# Patient Record
Sex: FEMALE | Race: WHITE | NOT HISPANIC OR LATINO | URBAN - METROPOLITAN AREA
[De-identification: names, ages, dates, MRNs, and addresses within clinical notes are randomized per-mention and may not be internally consistent; named-entity substitution may affect disease eponyms.]

---
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
## 2022-09-12 ENCOUNTER — NON-APPOINTMENT (OUTPATIENT)
Age: 22
End: 2022-09-12

## 2022-09-13 ENCOUNTER — EMERGENCY (EMERGENCY)
Facility: HOSPITAL | Age: 22
LOS: 1 days | Discharge: HOME | End: 2022-09-13
Attending: EMERGENCY MEDICINE | Admitting: EMERGENCY MEDICINE
Payer: OTHER GOVERNMENT

## 2022-09-13 VITALS
WEIGHT: 119.93 LBS | OXYGEN SATURATION: 96 % | TEMPERATURE: 99 F | RESPIRATION RATE: 18 BRPM | HEIGHT: 68 IN | SYSTOLIC BLOOD PRESSURE: 125 MMHG | HEART RATE: 113 BPM | DIASTOLIC BLOOD PRESSURE: 86 MMHG

## 2022-09-13 PROCEDURE — 99282 EMERGENCY DEPT VISIT SF MDM: CPT | Mod: 25

## 2022-09-13 PROCEDURE — 29515 APPLICATION SHORT LEG SPLINT: CPT | Mod: RT

## 2022-09-13 PROCEDURE — 99284 EMERGENCY DEPT VISIT MOD MDM: CPT

## 2022-09-13 NOTE — ED PROVIDER NOTE - PHYSICAL EXAMINATION
right foot- tenderness over 5th metatarsal, pain on ROM, positive 2+ pedal pulses, less than 2 sec cap refill

## 2022-09-13 NOTE — ED PROVIDER NOTE - NSFOLLOWUPINSTRUCTIONS_ED_ALL_ED_FT
Rest, ice   elevate   Take motrin 400mg every 6 hours for pain and swelling   Keep splint in place   Keep splint clean and dry   Apply ice to splint 3 x daily   Follow up with podiatrist   Use crutches for ambulating.   Return if any worsening or persistent symptoms.         Foot Fracture in Adults    WHAT YOU NEED TO KNOW:    What do I need to know about a foot fracture? A foot fracture is a break in a bone in your foot.  Foot Anatomy         What are the signs and symptoms of a foot fracture?   •Tenderness over the injured area      •Foot pain that increases when you try to stand or walk      •Numbness in your foot or toes      •Cracking sounds when you move your foot      •Swelling, bruising, blistering, or open skin breaks      •Trouble moving your foot or walking      •Foot shape that is not normal      How is a foot fracture diagnosed? Your healthcare provider will examine your foot and check for decreased feeling. He or she will check for any open skin breaks. He or she may check your foot movement. You may need any of the following tests:  •An x-ray, CT scan, or MRI may be used to check for a broken bone or other injury. Contrast liquid may be used to help your foot show up better in the pictures. Tell the healthcare provider if you have ever had an allergic reaction to contrast liquid. Do not enter the MRI room with anything metal. Metal can cause serious injury. Tell the healthcare provider if you have any metal in or on your body.      •A bone scan may be used to check for a broken bone. You will be given a small amount of radioactive dye in an IV. Pictures will then be taken of your foot.      How is a foot fracture treated? Treatment depends on the kind of fracture you have and how bad it is. You may need any of the following:  •A boot, cast, or splint may be put on your foot and lower leg to decrease your foot movement. These work to hold the broken bones in place, decrease pain, and prevent more damage to your foot.      •Medicines may be given to prevent or treat pain or a bacterial infection. You may also need a vaccine to prevent tetanus if bone broke through the skin. A tetanus shot is given if you have not had a booster in the past 5 to 10 years.      •Surgery may be used to put your bones back into the correct position. Wires, pins, plates, or screws may be used to keep the broken pieces lined up correctly and hold them together.      What can I do to help my foot heal?   •Rest your foot and avoid activities that cause pain.      •Apply ice to decrease swelling and pain, and to prevent tissue damage. Use an ice pack, or put crushed ice in a plastic bag. Cover it with a towel before you apply it. Use ice for 15 to 20 minutes every hour or as directed.      •Elevate your foot above the level of your heart as often as you can. This will help decrease swelling and pain. Prop your foot on pillows or blankets to keep it elevated comfortably.  Elevate Leg           •Physical therapy may be needed when your foot has healed. A physical therapist can teach you exercises to help improve movement and strength, and to decrease pain.      Call your local emergency number (911 in the [place]) if:   •You suddenly feel lightheaded and short of breath.      •You have chest pain when you take a deep breath or cough.      •You cough up blood.      When should I seek immediate care?   •The pain in your injured foot gets worse even after you rest and take pain medicine.      •The skin or toes of your foot become numb, swollen, cold, white, or blue.      •You have more pain or swelling than you did before a cast was put on.      •Your leg feels warm, tender, and painful. It may look swollen and red.      When should I call my doctor?   •You have a fever.      •You have new sores around your boot, cast, or splint.      •You have new or worsening trouble moving your foot.      •You notice a foul smell coming from under your cast.      •Your boot, cast, or splint gets damaged.      •You have questions or concerns about your condition or care.      CARE AGREEMENT:    You have the right to help plan your care. Learn about your health condition and how it may be treated. Discuss treatment options with your healthcare providers to decide what care you want to receive. You always have the right to refuse treatment.        © Copyright [employer] 2022           back to top                          © Copyright [employer] 2022

## 2022-09-13 NOTE — ED PROVIDER NOTE - CLINICAL SUMMARY MEDICAL DECISION MAKING FREE TEXT BOX
21y/o female with no pmhx sent from urgent care for casting of broken foot. Patient with anterior ecchymosis and swelling, very tender to touch. +2 pedal pulses, warm, +capillary refill.   XRay from urgent care 9/13:   Acute nondisplaced comminuted fracture of the base of the fifth metatarsal with likely extension into the fifth tarsometatarsal joint. No dislocation.    Plan: posterior splint, will send referral for orthopedics. 23y/o female with no pmhx sent from urgent care for casting of broken right foot. Patient with anterior ecchymosis and swelling of right foot, very tender to touch. +2 pedal pulses, warm, +capillary refill.   XRay from urgent care 9/13:   Acute nondisplaced comminuted fracture of the base of the fifth metatarsal with likely extension into the fifth tarsometatarsal joint. No dislocation.    Plan: posterior splint, will send referral for orthopedics. 21y/o female sent from urgent care due to right foot pain s/p stepping off a curb the wrong way and heard a "crunch."  Pt was seen at urgent care and was told fracture. pt was given crutches and sent home. Pt presents to ED requesting a " cast". Denies any other symptoms.  XRay from urgent care 9/13:   Acute nondisplaced comminuted fracture of the base of the fifth metatarsal with likely extension into the fifth tarsometatarsal joint. No dislocation.       Plan: posterior splint applied , will send referral for orthopedics.   pt stable for dc

## 2022-09-13 NOTE — ED ADULT NURSE NOTE - CHIEF COMPLAINT QUOTE
Pt states she stepped off a curb the wrong way and heard a "crunch." Foot was swelling some time after that and then Patient went to urgent care. At urgent care, they stated the foot was broken.

## 2022-09-13 NOTE — ED ADULT NURSE NOTE - OBJECTIVE STATEMENT
Pt states she stepped off a curb the wrong way and heard a "crunch." Foot was swelling some time after that and then Patient went to urgent care. At urgent care, they stated the foot was broken.  Pt. arrived with crutches and foot in boot.  Psotive PMS noted to bilateral lower extremities.

## 2022-09-13 NOTE — ED PROVIDER NOTE - MUSCULOSKELETAL MINIMAL EXAM
L foot/ankle/RANGE OF MOTION LIMITED/TENDERNESS R foot/ankle/RANGE OF MOTION LIMITED/TENDERNESS RANGE OF MOTION LIMITED/TENDERNESS

## 2022-09-13 NOTE — ED PROVIDER NOTE - MUSCULOSKELETAL [+], MLM
L foot and ankle pain/LIMITED RANGE OF MOTION R foot and ankle pain/LIMITED RANGE OF MOTION R foot pain/LIMITED RANGE OF MOTION

## 2022-09-13 NOTE — ED PROVIDER NOTE - OBJECTIVE STATEMENT
21y/o female sent from urgent care for casting of broken foot. 21y/o female sent from urgent care for casting of broken right foot. 21y/o female sent from urgent care due to right foot pain s/p stepping off a curb the wrong way and heard a "crunch."  Pt was seen at urgent care and was told fracture. pt was given crutches and sent home. Pt presents to ED requesting a " cast". Denies any other symptoms.

## 2022-09-13 NOTE — ED PROVIDER NOTE - PATIENT PORTAL LINK FT
You can access the FollowMyHealth Patient Portal offered by [facility] by registering at the following website: [email or web address]. By joining [employer]’s FollowMyHealth portal, you will also be able to view your health information using other applications (apps) compatible with our system.

## 2022-09-13 NOTE — ED PROVIDER NOTE - ATTENDING APP SHARED VISIT CONTRIBUTION OF CARE
This was shared visit with RAZ. I have reviewed and verified the documentation and independently performed the documented history, exam and mdm  21y/o female sent from urgent care due to right foot pain s/p stepping off a curb the wrong way and heard a "crunch."  Pt was seen at urgent care and was told fracture. pt was given crutches and sent home. Pt presents to ED requesting a " cast". Denies any other symptoms.

## 2022-10-11 ENCOUNTER — EMERGENCY (EMERGENCY)
Facility: HOSPITAL | Age: 22
LOS: 1 days | Discharge: HOME | End: 2022-10-11
Attending: EMERGENCY MEDICINE | Admitting: EMERGENCY MEDICINE
Payer: OTHER GOVERNMENT

## 2022-10-11 VITALS
OXYGEN SATURATION: 96 % | DIASTOLIC BLOOD PRESSURE: 91 MMHG | RESPIRATION RATE: 18 BRPM | HEART RATE: 122 BPM | WEIGHT: 119.93 LBS | TEMPERATURE: 98 F | SYSTOLIC BLOOD PRESSURE: 126 MMHG | HEIGHT: 68 IN

## 2022-10-11 VITALS
DIASTOLIC BLOOD PRESSURE: 76 MMHG | RESPIRATION RATE: 16 BRPM | HEART RATE: 93 BPM | SYSTOLIC BLOOD PRESSURE: 114 MMHG | OXYGEN SATURATION: 97 % | TEMPERATURE: 98 F

## 2022-10-11 DIAGNOSIS — F31.9 BIPOLAR DISORDER, UNSPECIFIED: ICD-10-CM

## 2022-10-11 DIAGNOSIS — Z90.49 ACQUIRED ABSENCE OF OTHER SPECIFIED PARTS OF DIGESTIVE TRACT: Chronic | ICD-10-CM

## 2022-10-11 LAB
ALBUMIN SERPL ELPH-MCNC: 4.2 G/DL — SIGNIFICANT CHANGE UP (ref 3.3–5)
ALP SERPL-CCNC: 74 U/L — SIGNIFICANT CHANGE UP (ref 40–120)
ALT FLD-CCNC: 34 U/L — SIGNIFICANT CHANGE UP (ref 10–45)
AMPHET UR-MCNC: NEGATIVE — SIGNIFICANT CHANGE UP
ANION GAP SERPL CALC-SCNC: 14 MMOL/L — SIGNIFICANT CHANGE UP (ref 5–17)
APAP SERPL-MCNC: <1 UG/ML — LOW (ref 10–30)
APPEARANCE UR: ABNORMAL
AST SERPL-CCNC: 29 U/L — SIGNIFICANT CHANGE UP (ref 10–40)
BACTERIA # UR AUTO: ABNORMAL /HPF
BARBITURATES UR SCN-MCNC: NEGATIVE — SIGNIFICANT CHANGE UP
BASOPHILS # BLD AUTO: 0.02 K/UL — SIGNIFICANT CHANGE UP (ref 0–0.2)
BASOPHILS NFR BLD AUTO: 0.3 % — SIGNIFICANT CHANGE UP (ref 0–2)
BENZODIAZ UR-MCNC: NEGATIVE — SIGNIFICANT CHANGE UP
BILIRUB SERPL-MCNC: 0.7 MG/DL — SIGNIFICANT CHANGE UP (ref 0.2–1.2)
BILIRUB UR-MCNC: NEGATIVE — SIGNIFICANT CHANGE UP
BUN SERPL-MCNC: 7 MG/DL — SIGNIFICANT CHANGE UP (ref 7–23)
CALCIUM SERPL-MCNC: 9.1 MG/DL — SIGNIFICANT CHANGE UP (ref 8.4–10.5)
CHLORIDE SERPL-SCNC: 106 MMOL/L — SIGNIFICANT CHANGE UP (ref 96–108)
CO2 SERPL-SCNC: 24 MMOL/L — SIGNIFICANT CHANGE UP (ref 22–31)
COCAINE METAB.OTHER UR-MCNC: NEGATIVE — SIGNIFICANT CHANGE UP
COLOR SPEC: YELLOW — SIGNIFICANT CHANGE UP
COMMENT - URINE: SIGNIFICANT CHANGE UP
CREAT SERPL-MCNC: 0.64 MG/DL — SIGNIFICANT CHANGE UP (ref 0.5–1.3)
DIFF PNL FLD: NEGATIVE — SIGNIFICANT CHANGE UP
EGFR: 130 ML/MIN/1.73M2 — SIGNIFICANT CHANGE UP
EOSINOPHIL # BLD AUTO: 0.02 K/UL — SIGNIFICANT CHANGE UP (ref 0–0.5)
EOSINOPHIL NFR BLD AUTO: 0.3 % — SIGNIFICANT CHANGE UP (ref 0–6)
EPI CELLS # UR: SIGNIFICANT CHANGE UP
ETHANOL SERPL-MCNC: 146 MG/DL — HIGH (ref 0–3)
GLUCOSE SERPL-MCNC: 80 MG/DL — SIGNIFICANT CHANGE UP (ref 70–99)
GLUCOSE UR QL: NEGATIVE — SIGNIFICANT CHANGE UP
HCT VFR BLD CALC: 43.4 % — SIGNIFICANT CHANGE UP (ref 34.5–45)
HGB BLD-MCNC: 15.1 G/DL — SIGNIFICANT CHANGE UP (ref 11.5–15.5)
IMM GRANULOCYTES NFR BLD AUTO: 0.3 % — SIGNIFICANT CHANGE UP (ref 0–0.9)
KETONES UR-MCNC: ABNORMAL
LEUKOCYTE ESTERASE UR-ACNC: NEGATIVE — SIGNIFICANT CHANGE UP
LITHIUM SERPL-MCNC: <0.2 MMOL/L — LOW (ref 0.6–1.2)
LYMPHOCYTES # BLD AUTO: 1.96 K/UL — SIGNIFICANT CHANGE UP (ref 1–3.3)
LYMPHOCYTES # BLD AUTO: 31.2 % — SIGNIFICANT CHANGE UP (ref 13–44)
MCHC RBC-ENTMCNC: 32.8 PG — SIGNIFICANT CHANGE UP (ref 27–34)
MCHC RBC-ENTMCNC: 34.8 GM/DL — SIGNIFICANT CHANGE UP (ref 32–36)
MCV RBC AUTO: 94.3 FL — SIGNIFICANT CHANGE UP (ref 80–100)
METHADONE UR-MCNC: NEGATIVE — SIGNIFICANT CHANGE UP
MONOCYTES # BLD AUTO: 0.26 K/UL — SIGNIFICANT CHANGE UP (ref 0–0.9)
MONOCYTES NFR BLD AUTO: 4.1 % — SIGNIFICANT CHANGE UP (ref 2–14)
NEUTROPHILS # BLD AUTO: 4.01 K/UL — SIGNIFICANT CHANGE UP (ref 1.8–7.4)
NEUTROPHILS NFR BLD AUTO: 63.8 % — SIGNIFICANT CHANGE UP (ref 43–77)
NITRITE UR-MCNC: NEGATIVE — SIGNIFICANT CHANGE UP
NRBC # BLD: 0 /100 WBCS — SIGNIFICANT CHANGE UP (ref 0–0)
OPIATES UR-MCNC: NEGATIVE — SIGNIFICANT CHANGE UP
PCP SPEC-MCNC: SIGNIFICANT CHANGE UP
PCP UR-MCNC: NEGATIVE — SIGNIFICANT CHANGE UP
PH UR: 7 — SIGNIFICANT CHANGE UP (ref 5–8)
PLATELET # BLD AUTO: 310 K/UL — SIGNIFICANT CHANGE UP (ref 150–400)
POTASSIUM SERPL-MCNC: 3.4 MMOL/L — LOW (ref 3.5–5.3)
POTASSIUM SERPL-SCNC: 3.4 MMOL/L — LOW (ref 3.5–5.3)
PROT SERPL-MCNC: 7.3 G/DL — SIGNIFICANT CHANGE UP (ref 6–8.3)
PROT UR-MCNC: NEGATIVE — SIGNIFICANT CHANGE UP
RAPID RVP RESULT: SIGNIFICANT CHANGE UP
RBC # BLD: 4.6 M/UL — SIGNIFICANT CHANGE UP (ref 3.8–5.2)
RBC # FLD: 12.3 % — SIGNIFICANT CHANGE UP (ref 10.3–14.5)
RBC CASTS # UR COMP ASSIST: NEGATIVE /HPF — SIGNIFICANT CHANGE UP (ref 0–4)
SALICYLATES SERPL-MCNC: 0.4 MG/DL — LOW (ref 3–30)
SARS-COV-2 RNA SPEC QL NAA+PROBE: SIGNIFICANT CHANGE UP
SODIUM SERPL-SCNC: 144 MMOL/L — SIGNIFICANT CHANGE UP (ref 135–145)
SP GR SPEC: 1.01 — SIGNIFICANT CHANGE UP (ref 1.01–1.02)
THC UR QL: NEGATIVE — SIGNIFICANT CHANGE UP
UROBILINOGEN FLD QL: NEGATIVE — SIGNIFICANT CHANGE UP
WBC # BLD: 6.29 K/UL — SIGNIFICANT CHANGE UP (ref 3.8–10.5)
WBC # FLD AUTO: 6.29 K/UL — SIGNIFICANT CHANGE UP (ref 3.8–10.5)
WBC UR QL: NEGATIVE /HPF — SIGNIFICANT CHANGE UP (ref 0–5)

## 2022-10-11 PROCEDURE — 93005 ELECTROCARDIOGRAM TRACING: CPT

## 2022-10-11 PROCEDURE — 96360 HYDRATION IV INFUSION INIT: CPT

## 2022-10-11 PROCEDURE — 93010 ELECTROCARDIOGRAM REPORT: CPT

## 2022-10-11 PROCEDURE — 80053 COMPREHEN METABOLIC PANEL: CPT

## 2022-10-11 PROCEDURE — 90792 PSYCH DIAG EVAL W/MED SRVCS: CPT | Mod: 95,GC

## 2022-10-11 PROCEDURE — 0225U NFCT DS DNA&RNA 21 SARSCOV2: CPT

## 2022-10-11 PROCEDURE — 80178 ASSAY OF LITHIUM: CPT

## 2022-10-11 PROCEDURE — 36415 COLL VENOUS BLD VENIPUNCTURE: CPT

## 2022-10-11 PROCEDURE — 99284 EMERGENCY DEPT VISIT MOD MDM: CPT

## 2022-10-11 PROCEDURE — 81001 URINALYSIS AUTO W/SCOPE: CPT

## 2022-10-11 PROCEDURE — 80307 DRUG TEST PRSMV CHEM ANLYZR: CPT

## 2022-10-11 PROCEDURE — 99285 EMERGENCY DEPT VISIT HI MDM: CPT | Mod: 25

## 2022-10-11 PROCEDURE — 85025 COMPLETE CBC W/AUTO DIFF WBC: CPT

## 2022-10-11 PROCEDURE — 87086 URINE CULTURE/COLONY COUNT: CPT

## 2022-10-11 RX ORDER — SODIUM CHLORIDE 9 MG/ML
1000 INJECTION INTRAMUSCULAR; INTRAVENOUS; SUBCUTANEOUS ONCE
Refills: 0 | Status: COMPLETED | OUTPATIENT
Start: 2022-10-11 | End: 2022-10-11

## 2022-10-11 RX ADMIN — Medication 1 MILLIGRAM(S): at 16:50

## 2022-10-11 RX ADMIN — SODIUM CHLORIDE 1000 MILLILITER(S): 9 INJECTION INTRAMUSCULAR; INTRAVENOUS; SUBCUTANEOUS at 12:30

## 2022-10-11 RX ADMIN — SODIUM CHLORIDE 1000 MILLILITER(S): 9 INJECTION INTRAMUSCULAR; INTRAVENOUS; SUBCUTANEOUS at 11:26

## 2022-10-11 NOTE — ED PROVIDER NOTE - OBJECTIVE STATEMENT
22 yr old female with hx of ETOH abuse, bipolar ds  ( currently not taking lithium for the last 2 weeks) presents today c/o  " fogginess", and alcohol withdrawal.   Pt reports she has been drinking  hard seltzers for 4-5 days, last drink was 9 hours ago. Patient states she feels tremulous and anxious, 1 episode of vomiting. Pt recently moved to the area and requesting new psychiatrist. 22 yr old female with hx of ETOH abuse, bipolar ds  ( currently not taking lithium for the last 2 weeks) presents today c/o  " fogginess", and alcohol withdrawal.   Pt reports she has been drinking 12- 20 hard seltzers for the last 4-5 days, last drink was 9 hours ago. Patient states she feels tremulous and anxious, 1 episode of vomiting last night. Pt recently moved to the area from [place], and ran out of her lithium. Pt requesting new psychiatrist. Pt reports since she has been off the lithium, she has been feeling anxious and bored which in turn she started drinking. No chest pain, sob or any other symptoms. 22 yr old female with hx of ETOH abuse, bipolar ds  ( currently not taking lithium for the last 2 weeks) presents today c/o  " fogginess", and alcohol withdrawal.   Pt reports she has been drinking 12- 20 hard seltzers for the last 4-5 days, last drink was 9 hours ago. Patient states she feels tremulous and anxious, 1 episode of vomiting last night. Pt recently moved to the area from [place], and ran out of her lithium. Pt requesting new psychiatrist. Pt reports since she has been off the lithium, she has been feeling anxious and bored which in turn she started drinking. No chest pain, sob or any other symptoms.   No SI, HI, or hallucinations

## 2022-10-11 NOTE — ED BEHAVIORAL HEALTH ASSESSMENT NOTE - SUMMARY
This patient is a 22 year old female with past psychiatric history of bipolar 1 d/o who presented to the  ED seeking assistance with ETOH withdrawal in the apparent context of nonadherence to her Lithium regimen for 2 weeks due to lack of access to her medication. Patient recently moved to the area (in July) from [place] and has no psychiatrist in [place]. She has been drinking heavily for at least the past week in order to cope with anxiety and boredom that she feels has gotten worse since she ran out of Lithium. This pattern has repeated itself in the past. She has no history of SA or NSSIB and has never been hospitalized for a psychiatric reason. Has no current desire or intent to end her own life and is seeking referrals for outpatient medication management to access her Lithium. Is able to contract for safety and is future oriented. Is not an imminent risk of harm to herself or others and therefore does not meet criteria for inpatient psychiatric admission. She states that she feels safe with completing her ETOH withdrawal treatment in the ED and then accessing MH and substance use resources as an outpatient. Collateral was non-concerning for imminent safety issues.

## 2022-10-11 NOTE — ED BEHAVIORAL HEALTH ASSESSMENT NOTE - DESCRIPTION
calm, cooperative, and behaviorally controlled in ED patient denies but otherwise unknown originally from [place]. Living with aunt and uncle in [place]. Attending nursing school.

## 2022-10-11 NOTE — ED BEHAVIORAL HEALTH ASSESSMENT NOTE - HPI (INCLUDE ILLNESS QUALITY, SEVERITY, DURATION, TIMING, CONTEXT, MODIFYING FACTORS, ASSOCIATED SIGNS AND SYMPTOMS)
Ms. 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 Per ED records, "Patient has been drinking hard seltzers for 4-5 days, last drink was 9 hours ago. Patient states she feels tremulous and anxious, 1 episode of vomiting. Patient is A&Ox4. Patient has hx of bipolar 1, has not been taking lithium for last 2 weeks, ran out of prescription. Patient would like referral for new psychiatrist, just moved to the area." Psychiatry was consulted to provide referrals to patient in order to help her get connected with a new psychiatrist that would be able to restart her Lithium as she does better when she is compliant with it. Patient was seen and interviewed via telepsych at the bedside where she was calm and cooperative with interview. She stated that she generally utilizes alcohol to help her cope with her anxiety, which has been spiking since she ran out of [place]. Idalmis recognizes that she should have gone to find a doctor sooner than this but states that she hasn't had the "wherewithal" to do so. Feels like her bipolar symptoms are generally well controlled on Lithium and that her last episode of marcela was 6 months ago when she was still living in [place]. States that she is feeling safe at home. 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 Plans on staying in [place] for at least the next two years. Denies any symptoms of psychosis or depression. Denies adamantly and convincingly any current suicidal ideation, intent or plan. Denies homicidal ideation, intent or plan.    Collateral was obtained from the patient's aunt Eden whom she resides with. She stated that she has noted that the patient has been drinking more frequently than usual for the past two weeks but has thus far been unable to convince her to cut down on her usage. States that she did note that she was going to a few AA meetings but none in the past week or so. Does think that the patient has been self-isolating more but has not heard/seen anything that would represent an imminent safety concern. Would prefer if patient got access to her lithium sooner rather than later as she does do demonstrably better on it. No barriers to d/c proposed however.

## 2022-10-11 NOTE — ED PROVIDER NOTE - CHPI ED SYMPTOMS NEG
no weakness/no chills Bactrim Pregnancy And Lactation Text: This medication is Pregnancy Category D and is known to cause fetal risk.  It is also excreted in breast milk.
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
## 2022-10-11 NOTE — ED ADULT NURSE NOTE - OBJECTIVE STATEMENT
Pt presents to ED from home for alcohol withdrawal. Pt states she has been binge drinking for the past 5 days and drinking 20 hard seltzers daily, last drink was 9 hours ago. She reports feeling anxious and like her head is "foggy". She reports an episode of vomiting last night. She is bipolar on lithium and hasn't taken her medication in 2 weeks because she ran out. Denies any current SI/HI.

## 2022-10-11 NOTE — ED ADULT NURSE NOTE - PRO INTERPRETER NEED 2
[facility]  [address] [facility], [address] [place] [address]      Colonoscopy Operative Report    Sean [facility]  [id number]  1953          Indications:    Iron deficiency anemia       Pre-operative Diagnosis: see indication above    Post-operative Diagnosis:  See findings below    :  Lacy Mclean MD    Surgical Assistant: Endoscopy Technician-1: Erik Schwab  Endoscopy RN-1: Oracio Grant RN    Implants:  None    Referring Provider: Davy Montes NP      Sedation:  MAC anesthesia Propofol      Procedure Details:  After informed consent was obtained with all risks and benefits of procedure explained and preoperative exam completed, the patient was taken to the endoscopy suite and placed in the left lateral decubitus position. Upon sequential sedation as per above, a digital rectal exam was performed demonstrating internal hemorrhoids. The Olympus videocolonoscope  was inserted in the rectum and carefully advanced to the cecum, which was identified by the ileocecal valve and appendiceal orifice. The cecum was identified by the ileocecal valve and appendiceal orifice. The quality of preparation was fair , there was some liquid stool , suctioned out. The colonoscope was slowly withdrawn with careful evaluation between folds. Retroflexion in the rectum was completed . Findings:   Rectum: normal  Sigmoid: normal  Descending Colon: normal  Transverse Colon: normal  Ascending Colon: normal  Cecum: normal  Terminal Ileum: normal      Specimen Removed:  none    Complications: None. EBL:  None.     Impression:    normal colonic mucosa throughout    Recommendations:  Recommendation for next colonscopy in 5 years    May d/c today if agreeable with hospitalist  Will follow as needed  F/u with our GI clinic in 1 month    Signed By: Lacy Mclean MD     7/9/2021  3:09 PM English

## 2022-10-11 NOTE — ED BEHAVIORAL HEALTH ASSESSMENT NOTE - OTHER
[facility]    [address] [place] [id number]    Phone:  [phone]           Merced Shipley   MRN: [id number]    Department:  [facility]   Date of Visit:  4/14/2017           Diagnosis     Lower abdominal pain        You were seen by ANDREA Jacobo.      Disclaimer     Follow-up Care:  It is your responsibility to arrange for follow-up care with your healthcare provider or as instructed. Call to get an appointment time.           Contact your doctor for follow-up appointment if not already scheduled.     Schedule an appointment as soon as possible for a visit with ANDREA Draper.    Specialty:  Physician Assistant    Contact information    [address] [place] [address] [id number] [phone]          Follow up with [facility].    Specialty:  Emergency Medicine    Comments:  If symptoms worsen    Contact information    [address] [place] [address] [id number] [phone]      Preventive care and screening     Your blood pressure was 122/58 today. If your blood pressure is higher than 120/80, we recommend follow up with your primary care provider to obtain basic health screening, including reassessment of your blood pressure, within three months.          Medications you received while in the ED through 04/15/2017  3:14 AM     Date/Time Order Dose Route Action    04/15/2017 12:32 AM ketorolac injection 15 mg 15 mg Intravenous Given    04/15/2017  2:10 AM morphine injection 4 mg 4 mg Intravenous Given    04/15/2017  2:35 AM iopamidol (ISOVUE-300) 61 % injection 100 mL 100 mL Intravenous Given         What to Do with Your Medications      START taking these medications today unless otherwise stated        Details    HYDROcodone-acetaminophen 5-325 MG per tablet   Commonly known as:  NORCO        Take 1 tablet by mouth every 6 hours as needed for Pain.   Authorizing Provider:  Charles Salazar                             Where to Get  Your Medications      You can get these medications from any pharmacy     Bring a paper prescription for each of these medications     HYDROcodone-acetaminophen 5-325 MG per tablet               Procedures and tests performed during your visit     CBC & Auto Differential    CT Abdomen Pelvis w/ IV contrast only    Chlamydia/GC by Nucleic Acid Amplification    Comprehensive Metabolic Panel    Lipase Level    Macro Urine - Point of Care    POCT Urinalysis dipstick    POCT Urine pregnancy    Wet [place]      Procedures     None      Imaging Results         CT Abdomen Pelvis w/ IV contrast only (Final result) Result time:  04/15/17 02:49:58    Final result    Impression:    IMPRESSION:  1. No acute CT abnormality to explain patient's abdominal pain.  2. 1.2 cm indeterminate low attenuating lesion in the right hepatic lobe  near the dome. Possibly a hemangioma.      Narrative:    Exam: CT of the abdomen pelvis with IV contrast.    COMPARISON: None    HISTORY: Abdominal pain    TECHNIQUE: After the uneventful administration 100 cc of Isovue-300, axial  images are acquired from the lung bases through the pelvis.    FINDINGS: Lung bases are clear.    There is an indeterminate 1.2 cm low attenuating lesion along the posterior  aspect of the liver near the dome, possibly a small hemangioma.     7 mm indeterminate low attenuating lesion at the interpolar region of the  right kidney too small to characterize, possibly a cyst.    Spleen, pancreas, adrenals, left kidney and gallbladder are unremarkable.    There is no abdominal or retroperitoneal lymphadenopathy. No ascites or  areas of abnormal fat stranding within the abdomen. No dilatation of the  large or small bowel. Appendix is normal.    Pelvis: No pelvic free fluid. No iliac chain or inguinal lymphadenopathy.  Uterus and adnexal regions are unremarkable.    There are no concerning lytic or sclerotic bony lesions.              US Pelvis Complete (Final result) Result time:   04/15/17 01:52:49    Final result    Impression:    IMPRESSION:    Unremarkable pelvic ultrasound.      Narrative:    TRANSABDOMINAL AND TRANSVAGINAL ULTRASOUND OF THE PELVIS    INDICATION:  Pain    COMPARISON:  None.    TECHNIQUE:  Transabdominal and transvaginal ultrasound of the pelvis  performed.    FINDINGS:    The uterus is visualized and measures 9.1 x 4.3 x 3.7 cm. The endometrium  measures 0.6 cm. No focal myometrial or endometrial masses are noted.     The right ovary is visualized and measures 2.5 x 1.7 x 2.0 cm. Multiple  follicles are noted. Normal color Doppler arterial blood flow is visualized  in the right ovary.    The left ovary is visualized and measures 2.5 x 3.2 x 1.4 cm. Multiple  follicles are noted. Normal color Doppler arterial blood flow is visualized  in the left ovary.    No adnexal masses or free fluid in the pelvis.              Discharge Instructions         Unknown Causes of Abdominal Pain (Female)    The exact cause of your abdominal (stomach) pain is not clear. This does not mean that this is something to worry about. Everyone likes to know the exact cause of the problem, but sometimes with abdominal pain, there is no clear-cut cause, and this could be a good thing. The good news is that your symptoms can be treated, and you will feel better.   Your condition does not seem serious now; however, sometimes the signs of a serious problem may take more time to appear. For this reason, it is important for you to watch for any new symptoms, problems, or worsening of your condition.  Over the next few days, the abdominal pain may come and go, or be continuous. Other common symptoms can include nausea and vomiting. Sometimes it can be difficult to tell if you feel nauseous, you may just feel bad and not associate that feeling with nausea. Constipation, diarrhea, and a fever may go along with the pain.  The pain may continue even if treated correctly over the following days. Depending on how  things go, sometimes the cause can become clear and may require further or different treatment. Additional evaluations, medications, or tests may also be needed.  Home care  Your healthcare provider may prescribe medicine for pain, symptoms, or an infection.  Follow the healthcare provider's instructions for taking these medicines.  General care  · Rest as much as you can until your next exam. No strenuous activities.  · Try to find positions that ease discomfort. A small pillow placed on the abdomen may help relieve pain.  · Something warm on your abdomen (such as a heating pad) may help, but be careful not to burn yourself.  Diet  · Do not force yourself to eat, especially if having cramps, vomiting, or diarrhea.  · Water is important so you do not get dehydrated. Soup may also be good. Sports drinks may also help, especially if they are not too acidic. Make sure you don't drink sugary drinks as this can make things worse. Take liquids in small amounts. Do not guzzle them.  · Caffeine sometimes makes the pain and cramping worse.  · Avoid dairy products if you have vomiting or diarrhea.  · Don't eat large amounts at a time. Wait a few minutes between bites.  · Eat a diet low in fiber (called a low-residue diet). Foods allowed include refined breads, white rice, fruit and vegetable juices without pulp, tender meats. These foods will pass more easily through the intestine.  · Avoid whole-grain foods, whole fruits and vegetables, meats, seeds and nuts, fried or fatty foods, dairy, alcohol and spicy foods until your symptoms go away.  Follow-up care  Follow up with your healthcare provider, or as advised, if your pain does not begin to improve in the next 24 hours.  Call 911  Call 911 if any of these occur:  · Trouble breathing  · Confusion  · Fainting or loss of consciousness  · Rapid heart rate  · Seizure  When to seek medical advice  Call your healthcare provider right away if any of these occur:  · Pain gets worse  or moves to the right lower abdomen  · New or worsening vomiting or diarrhea  · Swelling of the abdomen  · Unable to pass stool for more than 3 days  · Fever of 100.4ºF (38ºC) or higher, or as directed by your healthcare provider.  · Blood in vomit or bowel movements (dark red or black color)  · Jaundice (yellow color of eyes and skin)  · Weakness, dizziness  · Chest, arm, back, neck or jaw pain  · Unexpected vaginal bleeding or missed period  · Can't keep down liquids or water and are getting dehydrated  © 0688-6545 [employer]. [address] [place] [address]. All rights reserved. This information is not intended as a substitute for professional medical care. Always follow your healthcare professional's instructions.          Discharge References/Attachments     None      Medication Safety: What you need to know     Maintain Security - It is important to keep all medications in a secure location:  Keep out of the reach of children and pets    Consider using a lock box or locked filing cabinet    Place pill bottles in private area such as bedroom or drawer    Don't Share - It is illegal to share your prescription medication, even with family:  The doctor prescribes medications specifically for you and your body    You cannot be sure how the drug may affect others physically or emotionally    It is a criminal offense to share prescriptions    Proper Disposal - It is no longer acceptable to flush or throw away medications:  Recent studies show measurable amounts of medication have been found in drinking water and wildlife due to flushing or throwing away medications    Medication strength changes over time and is not typically safe after one year    Proper disposal removes the medication from your home in a safe way so that others don't have access to it. Use your local drug drop site.    Your local pharmacy can provide information on medication disposal options in your community. The  Department of [employer] Drug Enforcement Administration website also has information on safe medication disposal:  www.deadiversion.[facility].gov/drug_disposal/index.html         HUB defer

## 2022-10-11 NOTE — ED PROVIDER NOTE - CLINICAL SUMMARY MEDICAL DECISION MAKING FREE TEXT BOX
22 yr old female with hx of ETOH abuse, bipolar ds  ( currently not taking lithium for the last 2 weeks) presents today c/o  " fogginess", and alcohol withdrawal.   Pt reports she has been drinking 12- 20 hard seltzers for the last 4-5 days, last drink was 9 hours ago. Patient states she feels tremulous and anxious, 1 episode of vomiting last night. Pt recently moved to the area from [place], and ran out of her lithium. Pt requesting new psychiatrist. Pt reports since she has been off the lithium, she has been feeling anxious and bored which in turn she started drinking. No chest pain, sob or any other symptoms. 22 yr old female with hx of ETOH abuse, bipolar ds  ( currently not taking lithium for the last 2 weeks) presents today c/o  " fogginess", and alcohol withdrawal.   Pt reports she has been drinking 12- 20 hard seltzers for the last 4-5 days, last drink was 9 hours ago. Patient states she feels tremulous and anxious, 1 episode of vomiting last night. Pt recently moved to the area from [place], and ran out of her lithium. Pt requesting new psychiatrist. Pt reports since she has been off the lithium, she has been feeling depressed, anxious and bored which in turn she started drinking. No chest pain, sob or any other symptoms.    She has been seen in [place] (moved there first from [place]) at the [facility] [phone] and attended [facility] in [place], [place].   She wants to restart school, has been anxious and depressed and feels like she isn't functioning well enough to do so.     She wants to know if we could restart her lithium. Does she need a taper to get back on? Can she get addt'l meds for anxiety, etc. Requesting psychiatrist.   Will consult accordingly.     D/W tele who will assess.

## 2022-10-11 NOTE — ED BEHAVIORAL HEALTH ASSESSMENT NOTE - NSBHATTESTCOMMENTATTENDFT_PSY_A_CORE
21 yo F; PPHx of bipolar disorder; BIBS for alcohol withdrawal; psychiatry consulted for depression.  Per ED provider, there were no concerns about SI or safety threat but pt requested to speak to psychiatrist due to being off Lithium for 2 weeks and had questions regarding medications.  Pt also expressed turning to alcohol to treat symptoms.  On telepsych assessment with writer, pt states she came in for "alcohol withdrawal," noting that she felt confused, panicky, shaky, sweaty.  She reports some depressive symptoms of sleeping more and eating less since stopping Lithium but otherwise denies anhedonia, denies difficulty concentrating, denies SI/HI/AVH, denies other manic/psychotic symptoms.  She declines voluntary admission or inpatient detox/rehab as she is motivated for outpatient mental health and substance use tx.  She does not appear acutely depressed, psychotic, manic, agitated, anxious, or intoxicated.  She gives consent for telepsych to speak with her aunt/uncle.  Collateral obtained by fellow from pt's aunt was not concerning for safety.  Pt does not meet criteria for involuntary psych admission at this time.  Will defer further management of alcohol withdrawal to ED provider.      Covid Screen - Patient  testing? denies positive test in past 3 months  vaccine? received 3 doses  exposures? denies 21 yo F; PPHx of bipolar disorder; BIBS for alcohol withdrawal; psychiatry consulted for depression.  Per ED provider, there were no concerns about SI or safety threat but pt requested to speak to psychiatrist due to being off Lithium for 2 weeks and had questions regarding medications.  Pt also expressed turning to alcohol to treat symptoms.  On telepsych assessment with writer, pt states she came in for "alcohol withdrawal," noting that she felt confused, panicky, shaky, sweaty.  She reports some depressive symptoms of sleeping more and eating less since stopping Lithium but otherwise denies anhedonia, denies difficulty concentrating, denies SI/HI/AVH, denies other manic/psychotic symptoms.  She declines voluntary admission or inpatient detox/rehab as she is motivated for outpatient mental health and substance use tx.  She does not appear acutely depressed, psychotic, manic, agitated, anxious, or intoxicated.  She gives consent for telepsych to speak with her aunt/uncle.  Collateral obtained by fellow from pt's aunt was not concerning for safety.  Pt does not meet criteria for involuntary psych admission at this time.  Will defer further management of alcohol withdrawal to ED provider.  Outpatient mental health, crisis, and substance use resources faxed.    Covid Screen - Patient  testing? denies positive test in past 3 months  vaccine? received 3 doses  exposures? denies

## 2022-10-11 NOTE — ED PROVIDER NOTE - CARE PLAN
1 Principal Discharge DX:	Depression   Principal Discharge DX:	Depression  Secondary Diagnosis:	Alcohol intoxication

## 2022-10-11 NOTE — ED ADULT TRIAGE NOTE - WEIGHT IN KG
Cc: Upper Respiratory Infection.    HPI: Jaylon Pedersen is a 87 year old male presents accompanied by his wife with a one-week history of cough, congestion. The cough is productive of thick mucus. He is taking over-the-counter medications with minimal relief of his symptoms.    SMOKING HISTORY:   History   Smoking Status   • Former Smoker   • Packs/day: 0.30   • Types: Cigarettes   • Quit date: 1/1/1975   Smokeless Tobacco   • Former User   • Types: Snuff, Chew       Current Outpatient Prescriptions   Medication Sig Dispense Refill   • simvastatin (ZOCOR) 20 MG tablet Take 1 tablet by mouth nightly. 90 tablet 3   • lisinopril (PRINIVIL,ZESTRIL) 40 MG tablet TAKE ONE TABLET BY MOUTH ONE TIME DAILY 90 tablet 3   • tamsulosin (FLOMAX) 0.4 MG Cap TAKE ONE CAPSULE BY MOUTH DAILY AFTER A MEAL 90 capsule 3   • ASPIRIN 81 MG PO TABS one tablet by mouth every day 0 0     No current facility-administered medications for this visit.        ALLERGIES:   Allergen Reactions   • Advicor      Many years ago - pt unsure what this drug is   • Atenolol DIZZINESS   • Dyazide [Hydrochlorothiazide W/Triamterene] DIZZINESS   • Pravachol [Pravastatin Sodium]        EXAMINATION:  Visit Vitals   • /66   • Pulse 68   • Wt 83.9 kg   • SpO2 97%   • BMI 28.98 kg/m2     GENERAL: Normocephalic, atraumatic.  Patient is in no respiratory distress.  HEENT: PERRLA, EOMI, fundi benign, TM clear, nasal mucosa swollen, oral mucosa normal  NECK: Supple and nontender without goiter.  Palpable cervical lymph nodes were not appreciated.    HEART:  RRR, S1, S2.  No murmurs, rubs or gallops.  LUNGS: Slightly coarse breath sounds that clear with cough.  SKIN:  No rash.    A/P: Acute Sinusitis --    Rest, fluids, and over-the-counter  medications for symptomatic relief were discussed. Rx was given as detailed below.    The side effects of antibiotics, including the possibility of developing gastrointestinal upset, rash, and photosensitivity, were  discussed. The patient was instructed to return to clinic if his symptoms worsen, new problems develop, or if all symptoms are not completely resolved in 7-10 days.     54.4

## 2022-10-11 NOTE — ED BEHAVIORAL HEALTH ASSESSMENT NOTE - OTHER PAST PSYCHIATRIC HISTORY (INCLUDE DETAILS REGARDING ONSET, COURSE OF ILLNESS, INPATIENT/OUTPATIENT TREATMENT)
was a 72 hour hold at a [facility] in [place] for SI around a year ago, was d/c with referrals after legals . No history of SA, no history of NSSIB.

## 2022-10-11 NOTE — ED ADULT TRIAGE NOTE - CHIEF COMPLAINT QUOTE
Patient presents to ED from home complaining of alcohol withdrawal symptoms. Patient has been drinking hard seltzers for 4-5 days, last drink was 9 hours ago. Patient states she feels tremulous and anxious, 1 episode of vomiting. Patient is A&Ox4. Patient has hx of bipolar 1, has not been taking lithium for last 2 weeks, ran out of prescription. Patient would like referral for new psychiatrist, just moved to the area.

## 2022-10-11 NOTE — ED BEHAVIORAL HEALTH ASSESSMENT NOTE - NSSUICPROTFACT_PSY_ALL_CORE
Responsibility to children, family, or others/Identifies reasons for living/Engaged in work or school Responsibility to children, family, or others/Identifies reasons for living/Supportive social network of family or friends/Engaged in work or school

## 2022-10-11 NOTE — ED BEHAVIORAL HEALTH NOTE - BEHAVIORAL HEALTH NOTE
===================  PRE-HOSPITAL COURSE  ===================  SOURCE: ED RN and secondhand documentation   DETAILS:  Patient self-presented to ED c/o EtOH withdrawal, also endorsed that she has been out of her lithium since moving to [place].     ============  ED COURSE   ============  SOURCE: ED RN and secondhand documentation  BELONGINGS:  No belongings of note. All belongings were provided to hospital security, and patient currently in a gown with a 1:1 staff member.  BEHAVIOR:  Patient is AAOx4, endorsing binge drinking recently, endorsing feeling like she is in alcohol withdrawal. Patient reports feeling anxious and shaky. Patient denies HI/SI/AH/VH. Patient is calm and cooperative in ED, remains in good behavioral control. Patient is not aggressive or agitated. Resting comfortably in stretcher.   TREATMENT: Patient received Ativan 1mg in ED.   VISITORS: None at bedside.

## 2022-10-11 NOTE — ED ADULT NURSE REASSESSMENT NOTE - NS ED NURSE REASSESS COMMENT FT1
Pt resting comfortably, ate lunch. No new complaints. Awaiting telepsych consult. Will continue to monitor.

## 2022-10-11 NOTE — ED BEHAVIORAL HEALTH ASSESSMENT NOTE - RISK ASSESSMENT
This patient is a low imminent risk of harm to herself at this time due to lack of access to lethal means, her lack of history of NSSIB or SA, her current ability to contract for safety and her future orientation. She has chronically elevated suicide risk based on her history of trauma and substance use. Low Acute Suicide Risk

## 2022-10-11 NOTE — ED PROVIDER NOTE - PATIENT PORTAL LINK FT
You can access the FollowMyHealth Patient Portal offered by [facility] by registering at the following website: [email or web address]. By joining [employer]’s FollowMyHealth portal, you will also be able to view your health information using other applications (apps) compatible with our system.

## 2022-10-11 NOTE — ED BEHAVIORAL HEALTH ASSESSMENT NOTE - SAFETY PLAN ADDT'L DETAILS
Safety plan discussed with... Safety plan discussed with.../Education provided regarding environmental safety / lethal means restriction/Provision of National Suicide Prevention Lifeline 1-026-650-KMUE (7085)

## 2022-10-11 NOTE — ED PROVIDER NOTE - ATTENDING APP SHARED VISIT CONTRIBUTION OF CARE
Pa with TARAH Steve. 22 yr old female with hx of ETOH abuse, bipolar ds  ( currently not taking lithium for the last 2 weeks) presents today c/o  " fogginess", and alcohol withdrawal.   Pt reports she has been drinking 12- 20 hard seltzers for the last 4-5 days, last drink was 9 hours ago. Patient states she feels tremulous and anxious, 1 episode of vomiting last night. Pt recently moved to the area from [place], and ran out of her lithium. Pt requesting new psychiatrist. Pt reports since she has been off the lithium, she has been feeling depressed, anxious and bored which in turn she started drinking. No chest pain, sob or any other symptoms.    She has been seen in [place] (moved there first from [place]) at the [facility] [phone] and attended [facility] in [place], [place].   She wants to restart school, has been anxious and depressed and feels like she isn't functioning well enough to do so.     She wants to know if we could restart her lithium. Does she need a taper to get back on? Can she get addt'l meds for anxiety, etc. Requesting psychiatrist.   Will consult accordingly.     D/W teleBH who will assess.       I performed a face to face bedside interview with patient regarding history of present illness, review of symptoms and past medical history. I completed an independent physical exam.  I have discussed the patient's plan of care with Physician Assistant (PA). I agree with note as stated above, having amended the EMR as needed to reflect my findings.   This includes History of Present Illness, HIV, Past Medical/Surgical/Family/Social History, Allergies and Home Medications, Review of Systems, Physical Exam, and any Progress Notes during the time I functioned as the attending physician for this patient.

## 2022-10-11 NOTE — ED PROVIDER NOTE - NSFOLLOWUPINSTRUCTIONS_ED_ALL_ED_FT
Alcohol Intoxication      Alcohol intoxication happens when you cannot think clearly or function well (get impaired) after drinking alcohol. This can happen after just one drink. The effect that alcohol has on how you think and function depends on:  •How much alcohol you drank.      •Your age, your weight, and whether you are a man or a woman.      •How often you drink alcohol.      •If you have other medical problems.      Alcohol intoxication can range from mild to very bad. It can be dangerous, especially if you:•Drink a large amount of alcohol in a short time (binge drink).  •For women, binge drinking is having four or more drinks at one time.      •For men, binge drinking is having five or more drinks at one time.        •Take certain drugs or medicines.      If you or anyone around you seems intoxicated:  •Tell someone.      •Get help from someone.        Follow these instructions at home:      Eating and drinking    •Ask your doctor if alcohol is safe for you.•If your doctor says that alcohol is safe for you, limit how much you drink to no more than 1 drink a day for women who are not pregnant and 2 drinks a day for men. One drink equals one of these:  •12 oz of beer.      •5 oz of wine.      •1½ oz of hard liquor.      •Do not drink alcohol if:  •Your doctor tells you not to drink.      •You are pregnant, may be pregnant, or are planning to get pregnant.      •You are under the legal drinking age (21 years old in the [place]      •You are taking medicines that you should not take with alcohol.      •Alcohol causes your medical problem to get worse.      •You have to drive or do activities that need you to be alert.      •You have substance use disorder. This is when using alcohol again and again causes problems with your health, your relationships, or with what you need to do at work, home, or school.        •Be sure to eat before you drink alcohol. Avoid drinking when you have an empty stomach.    •Make sure you have enough fluid in your body (stay hydrated). To do this:  •Drink enough fluid to keep your pee (urine) pale yellow.      •Avoid caffeine, which may be in coffee, tea, and some sodas. Caffeine can make you thirsty.        •Try not to drink more than one drink an hour.      •If you are having more than one drink, have a drink without alcohol (such as water) between your drinks.          General instructions      •Take over-the-counter and prescription medicines only as told by your doctor.      • Do not drive after drinking any amount of alcohol. Plan for a designated  or another way to go home.      •Have someone you trust stay with you while you are intoxicated. Youshould not be left alone.      •Keep all follow-up visits as told by your doctor. This is important.        Contact a doctor if:    •You do not feel better after a few days.      •You have problems at work, at school, or at home due to drinking.        Get help right away if:  •You have any of the following:•Moderate or very bad trouble with:  •Movement (coordination).      •Talking.      •Memory.      •Paying attention to things.        •Trouble staying awake.      •Being very confused.      •Jerky movements that you cannot control (seizure).      •Light-headedness.      •Fainting.      •Throwing up (vomiting) blood. The blood may be bright red or look like coffee grounds.    •Blood in your poop (stool). The blood may:  •Be bright red.      •Make your poop black and tarry and make it smell bad.        •Feeling shaky when you try to stop drinking.      •Thoughts about hurting yourself or others.        If you ever feel like you may hurt yourself or others, or have thoughts about taking your own life, get help right away. You can go to your nearest emergency department or call:   • Your local emergency services (911 in the [place]       • A suicide crisis helpline, such as the National Suicide Prevention Lifeline at 1-429.573.7136. This is open 24 hours a day.         Summary    •Alcohol intoxication happens when you cannot think clearly or function well (get impaired) after drinking alcohol. This can happen after just one drink.      •If your doctor says that alcohol is safe for you, limit how much you drink to no more than 1 drink a day for women who are not pregnant and 2 drinks a day for men.      •Contact a doctor if you have problems at work, at school, or at home due to drinking.      •Get help right away if you have thoughts about hurting yourself or others.            Depression    WHAT YOU NEED TO KNOW:    Depression is a medical condition that causes feelings of sadness or hopelessness that do not go away. Depression may cause you to lose interest in things you used to enjoy. These feelings may interfere with your daily life.    DISCHARGE INSTRUCTIONS:    Call your local emergency number (911 in the [place]) if:   •You think about harming yourself or someone else.      •You have done something on purpose to hurt yourself.      Call your therapist or doctor if:   •Your symptoms do not improve.      •You cannot make it to your next appointment.       •You have new symptoms.      •You have questions or concerns about your condition or care.      The following resources are available at any time to help you, if needed:   •Contact a suicide prevention organization:   ?For the [employer] Suicide and Crisis Lifeline: ?Call or text [employer]      ?Send a chat on https://deskwolf/chat      ?Call 8-186-159-1718 (1-800-273-TALK)      ?For the Suicide Hotline, call 9-008-370-5658 (3-603-IFUDTJT)      •For a list of international numbers: https://save.org/find-help/international-resources/      Medicines:   •Antidepressants may be given to improve or balance your mood. You may need to take this medicine for several weeks before you begin to feel better.      •Take your medicine as directed. Contact your healthcare provider if you think your medicine is not helping or if you have side effects. Tell your provider if you are allergic to any medicine. Keep a list of the medicines, vitamins, and herbs you take. Include the amounts, and when and why you take them. Bring the list or the pill bottles to follow-up visits. Carry your medicine list with you in case of an emergency.      Therapy is often used together with medicine to relieve depression. Therapy is a way for you to talk about your feelings and anything that may be causing depression. Therapy can be done alone or in a group. It may also be done with family members or a significant other.    Self-care:   •Get regular physical activity. Try to be active for 30 minutes, 3 to 5 days a week. Physical activity can help relieve depression. Work with your healthcare provider to develop a plan that you enjoy. It may help to ask someone to be active with you.      •Create a regular sleep schedule. A routine can help you relax before bed. Listen to music, read, or do yoga. Try to go to bed and wake up at the same time every day. Sleep is important for emotional health.      •Eat a variety of healthy foods. Healthy foods include fruits, vegetables, whole-grain breads, low-fat dairy products, lean meats, fish, and cooked beans. A healthy meal plan is low in fat, salt, and added sugar.      •Do not drink alcohol or use drugs. Alcohol and drugs can make depression worse. Talk to your therapist or doctor if you need help quitting.      Follow up with your healthcare provider as directed: Your healthcare provider will monitor your progress at follow-up visits. He or she will also monitor your medicine if you take antidepressants. Your healthcare provider will ask if the medicine is helping. Tell him or her about any side effects or problems you may have with your medicine. The type or amount of medicine may need to be changed. Write down your questions so you remember to ask them during your visits.    For more information or support:   •[employer] [place] [employer]  [address]  [place], [place]  Phone: 1-748.521.3513  Phone: 1-525.687.5567  Web Address: http://www.nu.org    •988 Suicide and Crisis Lifeline  [address]  [place], [place]  Phone: 2-251-150  Web Address: http://www.suicidepreventionlifeline.org OR https://[employer].org/chat/           © Copyright [employer] 2022           back to top                          © Copyright [employer] 2022

## 2022-10-11 NOTE — ED ADULT NURSE NOTE - NSIMPLEMENTINTERV_GEN_ALL_ED
Implemented All Universal Safety Interventions:  [place] to call system. Call bell, personal items and telephone within reach. Instruct patient to call for assistance. Room bathroom lighting operational. Non-slip footwear when patient is off stretcher. Physically safe environment: no spills, clutter or unnecessary equipment. Stretcher in lowest position, wheels locked, appropriate side rails in place.

## 2022-10-12 ENCOUNTER — OUTPATIENT (OUTPATIENT)
Dept: OUTPATIENT SERVICES | Facility: HOSPITAL | Age: 22
LOS: 1 days | Discharge: STILL ADMITTED OR ADMINISTRATIVE | End: 2022-10-12
Payer: OTHER GOVERNMENT

## 2022-10-12 DIAGNOSIS — Z90.49 ACQUIRED ABSENCE OF OTHER SPECIFIED PARTS OF DIGESTIVE TRACT: Chronic | ICD-10-CM

## 2022-10-12 PROBLEM — F31.9 BIPOLAR DISORDER, UNSPECIFIED: Chronic | Status: ACTIVE | Noted: 2022-10-11

## 2022-10-12 LAB
CULTURE RESULTS: SIGNIFICANT CHANGE UP
SPECIMEN SOURCE: SIGNIFICANT CHANGE UP

## 2022-10-12 PROCEDURE — 90839 PSYTX CRISIS INITIAL 60 MIN: CPT | Mod: 95

## 2022-10-19 LAB
ALBUMIN SERPL ELPH-MCNC: 4.3 G/DL — SIGNIFICANT CHANGE UP (ref 3.3–5)
ALP SERPL-CCNC: 47 U/L — SIGNIFICANT CHANGE UP (ref 40–120)
ALT FLD-CCNC: 13 U/L — SIGNIFICANT CHANGE UP (ref 4–33)
ANION GAP SERPL CALC-SCNC: 12 MMOL/L — SIGNIFICANT CHANGE UP (ref 7–14)
AST SERPL-CCNC: 18 U/L — SIGNIFICANT CHANGE UP (ref 4–32)
BILIRUB SERPL-MCNC: 0.5 MG/DL — SIGNIFICANT CHANGE UP (ref 0.2–1.2)
BUN SERPL-MCNC: 7 MG/DL — SIGNIFICANT CHANGE UP (ref 7–23)
CALCIUM SERPL-MCNC: 9.4 MG/DL — SIGNIFICANT CHANGE UP (ref 8.4–10.5)
CHLORIDE SERPL-SCNC: 107 MMOL/L — SIGNIFICANT CHANGE UP (ref 98–107)
CO2 SERPL-SCNC: 19 MMOL/L — LOW (ref 22–31)
CREAT SERPL-MCNC: 0.63 MG/DL — SIGNIFICANT CHANGE UP (ref 0.5–1.3)
EGFR: 130 ML/MIN/1.73M2 — SIGNIFICANT CHANGE UP
GLUCOSE SERPL-MCNC: 98 MG/DL — SIGNIFICANT CHANGE UP (ref 70–99)
LITHIUM SERPL-MCNC: 0.7 MMOL/L — SIGNIFICANT CHANGE UP (ref 0.6–1.2)
POTASSIUM SERPL-MCNC: 4 MMOL/L — SIGNIFICANT CHANGE UP (ref 3.5–5.3)
POTASSIUM SERPL-SCNC: 4 MMOL/L — SIGNIFICANT CHANGE UP (ref 3.5–5.3)
PROT SERPL-MCNC: 6.6 G/DL — SIGNIFICANT CHANGE UP (ref 6–8.3)
SODIUM SERPL-SCNC: 138 MMOL/L — SIGNIFICANT CHANGE UP (ref 135–145)

## 2022-10-19 PROCEDURE — 99214 OFFICE O/P EST MOD 30 MIN: CPT | Mod: 95

## 2022-10-20 DIAGNOSIS — F31.9 BIPOLAR DISORDER, UNSPECIFIED: ICD-10-CM

## 2022-10-25 ENCOUNTER — TRANSCRIPTION ENCOUNTER (OUTPATIENT)
Age: 22
End: 2022-10-25

## 2022-12-06 NOTE — ED PROVIDER NOTE - TIMING
Quality 130: Documentation Of Current Medications In The Medical Record: Current Medications Documented Detail Level: Detailed sudden onset

## 2022-12-10 ENCOUNTER — EMERGENCY (EMERGENCY)
Facility: HOSPITAL | Age: 22
LOS: 1 days | Discharge: HOME | End: 2022-12-10
Attending: EMERGENCY MEDICINE | Admitting: EMERGENCY MEDICINE
Payer: OTHER GOVERNMENT

## 2022-12-10 VITALS
RESPIRATION RATE: 19 BRPM | OXYGEN SATURATION: 96 % | TEMPERATURE: 99 F | DIASTOLIC BLOOD PRESSURE: 94 MMHG | HEART RATE: 109 BPM | WEIGHT: 132.28 LBS | HEIGHT: 68 IN | SYSTOLIC BLOOD PRESSURE: 136 MMHG

## 2022-12-10 VITALS
DIASTOLIC BLOOD PRESSURE: 69 MMHG | OXYGEN SATURATION: 98 % | RESPIRATION RATE: 14 BRPM | HEART RATE: 95 BPM | SYSTOLIC BLOOD PRESSURE: 107 MMHG | TEMPERATURE: 98 F

## 2022-12-10 DIAGNOSIS — Z90.49 ACQUIRED ABSENCE OF OTHER SPECIFIED PARTS OF DIGESTIVE TRACT: Chronic | ICD-10-CM

## 2022-12-10 LAB
ALBUMIN SERPL ELPH-MCNC: 4.2 G/DL — SIGNIFICANT CHANGE UP (ref 3.3–5)
ALP SERPL-CCNC: 72 U/L — SIGNIFICANT CHANGE UP (ref 40–120)
ALT FLD-CCNC: 20 U/L — SIGNIFICANT CHANGE UP (ref 10–45)
ANION GAP SERPL CALC-SCNC: 9 MMOL/L — SIGNIFICANT CHANGE UP (ref 5–17)
AST SERPL-CCNC: 25 U/L — SIGNIFICANT CHANGE UP (ref 10–40)
BASOPHILS # BLD AUTO: 0.03 K/UL — SIGNIFICANT CHANGE UP (ref 0–0.2)
BASOPHILS NFR BLD AUTO: 0.4 % — SIGNIFICANT CHANGE UP (ref 0–2)
BILIRUB SERPL-MCNC: 0.4 MG/DL — SIGNIFICANT CHANGE UP (ref 0.2–1.2)
BUN SERPL-MCNC: 6 MG/DL — LOW (ref 7–23)
CALCIUM SERPL-MCNC: 8.6 MG/DL — SIGNIFICANT CHANGE UP (ref 8.4–10.5)
CHLORIDE SERPL-SCNC: 104 MMOL/L — SIGNIFICANT CHANGE UP (ref 96–108)
CO2 SERPL-SCNC: 26 MMOL/L — SIGNIFICANT CHANGE UP (ref 22–31)
CREAT SERPL-MCNC: 0.59 MG/DL — SIGNIFICANT CHANGE UP (ref 0.5–1.3)
EGFR: 132 ML/MIN/1.73M2 — SIGNIFICANT CHANGE UP
EOSINOPHIL # BLD AUTO: 0.07 K/UL — SIGNIFICANT CHANGE UP (ref 0–0.5)
EOSINOPHIL NFR BLD AUTO: 0.9 % — SIGNIFICANT CHANGE UP (ref 0–6)
ETHANOL SERPL-MCNC: 107 MG/DL — HIGH (ref 0–3)
GLUCOSE SERPL-MCNC: 93 MG/DL — SIGNIFICANT CHANGE UP (ref 70–99)
HCG SERPL-ACNC: <1 MIU/ML — SIGNIFICANT CHANGE UP
HCT VFR BLD CALC: 41.2 % — SIGNIFICANT CHANGE UP (ref 34.5–45)
HGB BLD-MCNC: 14.3 G/DL — SIGNIFICANT CHANGE UP (ref 11.5–15.5)
IMM GRANULOCYTES NFR BLD AUTO: 0.2 % — SIGNIFICANT CHANGE UP (ref 0–0.9)
LYMPHOCYTES # BLD AUTO: 2.98 K/UL — SIGNIFICANT CHANGE UP (ref 1–3.3)
LYMPHOCYTES # BLD AUTO: 36.3 % — SIGNIFICANT CHANGE UP (ref 13–44)
MCHC RBC-ENTMCNC: 32.5 PG — SIGNIFICANT CHANGE UP (ref 27–34)
MCHC RBC-ENTMCNC: 34.7 GM/DL — SIGNIFICANT CHANGE UP (ref 32–36)
MCV RBC AUTO: 93.6 FL — SIGNIFICANT CHANGE UP (ref 80–100)
MONOCYTES # BLD AUTO: 0.44 K/UL — SIGNIFICANT CHANGE UP (ref 0–0.9)
MONOCYTES NFR BLD AUTO: 5.4 % — SIGNIFICANT CHANGE UP (ref 2–14)
NEUTROPHILS # BLD AUTO: 4.67 K/UL — SIGNIFICANT CHANGE UP (ref 1.8–7.4)
NEUTROPHILS NFR BLD AUTO: 56.8 % — SIGNIFICANT CHANGE UP (ref 43–77)
NRBC # BLD: 0 /100 WBCS — SIGNIFICANT CHANGE UP (ref 0–0)
PLATELET # BLD AUTO: 271 K/UL — SIGNIFICANT CHANGE UP (ref 150–400)
POTASSIUM SERPL-MCNC: 3.5 MMOL/L — SIGNIFICANT CHANGE UP (ref 3.5–5.3)
POTASSIUM SERPL-SCNC: 3.5 MMOL/L — SIGNIFICANT CHANGE UP (ref 3.5–5.3)
PROT SERPL-MCNC: 7.1 G/DL — SIGNIFICANT CHANGE UP (ref 6–8.3)
RBC # BLD: 4.4 M/UL — SIGNIFICANT CHANGE UP (ref 3.8–5.2)
RBC # FLD: 11.9 % — SIGNIFICANT CHANGE UP (ref 10.3–14.5)
SODIUM SERPL-SCNC: 139 MMOL/L — SIGNIFICANT CHANGE UP (ref 135–145)
TROPONIN I, HIGH SENSITIVITY RESULT: 5.6 NG/L — SIGNIFICANT CHANGE UP
WBC # BLD: 8.21 K/UL — SIGNIFICANT CHANGE UP (ref 3.8–10.5)
WBC # FLD AUTO: 8.21 K/UL — SIGNIFICANT CHANGE UP (ref 3.8–10.5)

## 2022-12-10 PROCEDURE — 96361 HYDRATE IV INFUSION ADD-ON: CPT

## 2022-12-10 PROCEDURE — 85025 COMPLETE CBC W/AUTO DIFF WBC: CPT

## 2022-12-10 PROCEDURE — 93010 ELECTROCARDIOGRAM REPORT: CPT

## 2022-12-10 PROCEDURE — 71045 X-RAY EXAM CHEST 1 VIEW: CPT

## 2022-12-10 PROCEDURE — 96375 TX/PRO/DX INJ NEW DRUG ADDON: CPT

## 2022-12-10 PROCEDURE — 80053 COMPREHEN METABOLIC PANEL: CPT

## 2022-12-10 PROCEDURE — 96374 THER/PROPH/DIAG INJ IV PUSH: CPT

## 2022-12-10 PROCEDURE — 84484 ASSAY OF TROPONIN QUANT: CPT

## 2022-12-10 PROCEDURE — 99285 EMERGENCY DEPT VISIT HI MDM: CPT | Mod: 25

## 2022-12-10 PROCEDURE — 81025 URINE PREGNANCY TEST: CPT

## 2022-12-10 PROCEDURE — 84702 CHORIONIC GONADOTROPIN TEST: CPT

## 2022-12-10 PROCEDURE — 71045 X-RAY EXAM CHEST 1 VIEW: CPT | Mod: 26

## 2022-12-10 PROCEDURE — 93005 ELECTROCARDIOGRAM TRACING: CPT

## 2022-12-10 PROCEDURE — 36415 COLL VENOUS BLD VENIPUNCTURE: CPT

## 2022-12-10 PROCEDURE — 99053 MED SERV 10PM-8AM 24 HR FAC: CPT

## 2022-12-10 PROCEDURE — 99285 EMERGENCY DEPT VISIT HI MDM: CPT

## 2022-12-10 PROCEDURE — 80307 DRUG TEST PRSMV CHEM ANLYZR: CPT

## 2022-12-10 RX ORDER — SODIUM CHLORIDE 9 MG/ML
1000 INJECTION INTRAMUSCULAR; INTRAVENOUS; SUBCUTANEOUS ONCE
Refills: 0 | Status: COMPLETED | OUTPATIENT
Start: 2022-12-10 | End: 2022-12-10

## 2022-12-10 RX ORDER — THIAMINE MONONITRATE (VIT B1) 100 MG
100 TABLET ORAL ONCE
Refills: 0 | Status: COMPLETED | OUTPATIENT
Start: 2022-12-10 | End: 2022-12-10

## 2022-12-10 RX ORDER — LITHIUM CARBONATE 300 MG/1
900 TABLET, EXTENDED RELEASE ORAL ONCE
Refills: 0 | Status: COMPLETED | OUTPATIENT
Start: 2022-12-10 | End: 2022-12-10

## 2022-12-10 RX ORDER — LITHIUM CARBONATE 300 MG/1
900 TABLET, EXTENDED RELEASE ORAL
Qty: 0 | Refills: 0 | DISCHARGE

## 2022-12-10 RX ORDER — FOLIC ACID 0.8 MG
1 TABLET ORAL ONCE
Refills: 0 | Status: COMPLETED | OUTPATIENT
Start: 2022-12-10 | End: 2022-12-10

## 2022-12-10 RX ORDER — ONDANSETRON 8 MG/1
4 TABLET, FILM COATED ORAL ONCE
Refills: 0 | Status: COMPLETED | OUTPATIENT
Start: 2022-12-10 | End: 2022-12-10

## 2022-12-10 RX ORDER — FAMOTIDINE 10 MG/ML
20 INJECTION INTRAVENOUS DAILY
Refills: 0 | Status: DISCONTINUED | OUTPATIENT
Start: 2022-12-10 | End: 2022-12-13

## 2022-12-10 RX ADMIN — Medication 1 MILLIGRAM(S): at 06:22

## 2022-12-10 RX ADMIN — SODIUM CHLORIDE 1000 MILLILITER(S): 9 INJECTION INTRAMUSCULAR; INTRAVENOUS; SUBCUTANEOUS at 09:15

## 2022-12-10 RX ADMIN — SODIUM CHLORIDE 1000 MILLILITER(S): 9 INJECTION INTRAMUSCULAR; INTRAVENOUS; SUBCUTANEOUS at 07:50

## 2022-12-10 RX ADMIN — SODIUM CHLORIDE 1000 MILLILITER(S): 9 INJECTION INTRAMUSCULAR; INTRAVENOUS; SUBCUTANEOUS at 06:11

## 2022-12-10 RX ADMIN — ONDANSETRON 4 MILLIGRAM(S): 8 TABLET, FILM COATED ORAL at 06:11

## 2022-12-10 RX ADMIN — Medication 100 MILLIGRAM(S): at 06:11

## 2022-12-10 RX ADMIN — SODIUM CHLORIDE 1000 MILLILITER(S): 9 INJECTION INTRAMUSCULAR; INTRAVENOUS; SUBCUTANEOUS at 07:45

## 2022-12-10 RX ADMIN — LITHIUM CARBONATE 900 MILLIGRAM(S): 300 TABLET, EXTENDED RELEASE ORAL at 06:23

## 2022-12-10 RX ADMIN — Medication 2 MILLIGRAM(S): at 06:11

## 2022-12-10 RX ADMIN — FAMOTIDINE 200 MILLIGRAM(S): 10 INJECTION INTRAVENOUS at 06:11

## 2022-12-10 RX ADMIN — Medication 1 TABLET(S): at 06:22

## 2022-12-10 NOTE — ED PROVIDER NOTE - CLINICAL SUMMARY MEDICAL DECISION MAKING FREE TEXT BOX
22-year-old female with history of alcohol abuse complaining of alcohol withdrawal for the last 2 hours, feeling shaky, anxious, palpitations, chest pain, nausea.  Last drink 6 hours ago.  Mildly tachycardic at triage.  Appears mildly anxious otherwise no significant tremor.  Will check labs, EKG.  Give Ativan IV, IV fluids.  Hemodynamic monitoring. We will give patient her dose of lithium. Reassess 22-year-old female with history of alcohol abuse complaining of alcohol withdrawal for the last 2 hours, feeling shaky, anxious, palpitations, chest pain, nausea.  Last drink 6 hours ago.  Mildly tachycardic at triage.  Appears mildly anxious otherwise no significant tremor.  Will check labs, EKG.  Give Ativan IV, IV fluids.  Hemodynamic monitoring. We will give patient her dose of lithium. Reassess    0640: pt feeling better. drowsy currently. no tremors. vitals normal. iv fluids running. will dc p iv fluids 22-year-old female with history of alcohol abuse complaining of alcohol withdrawal for the last 2 hours, feeling shaky, anxious, palpitations, chest pain, nausea.  Last drink 6 hours ago.  Mildly tachycardic at triage.  Appears mildly anxious otherwise no significant tremor.  Will check labs, EKG.  Give Ativan IV, IV fluids.  Hemodynamic monitoring. We will give patient her dose of lithium. Reassess    0640: pt feeling better. drowsy currently. no tremors. vitals normal. iv fluids running. will dc p iv fluids  9.15 am pt feels better will jolene robertson

## 2022-12-10 NOTE — ED PROVIDER NOTE - NSFOLLOWUPINSTRUCTIONS_ED_ALL_ED_FT
Follow Up in 1-3 Days with your own doctor or with  [address] [address]  Phone: [phone]      Alcohol Withdrawal    WHAT YOU NEED TO KNOW:    Alcohol withdrawal is a group of symptoms that occur when you drink alcohol daily and suddenly stop. It can begin within 5 hours of your last drink and get worse over 2 to 3 days. Withdrawal may also happen if you suddenly reduce the amount of alcohol that you normally drink.    DISCHARGE INSTRUCTIONS:    Call your local emergency number (911 in the ) for any of the following:   •You have sudden chest pain or trouble breathing.      •You pass out or think you had a seizure.      •You feel like you want to harm yourself or others.      Call your doctor if:   •Your breathing or heartbeat is faster than usual.      •You are confused, hallucinating, or extremely agitated.      •You cannot stop vomiting, or you vomit blood.      •You are shaking and it does not get better after you take your medicine.      •You have questions or concerns about your condition or care.      Medicines:   •Medicines may be given to calm you and help manage your symptoms. Vitamin supplements such as thiamine (vitamin B1) may be recommended.      •Take your medicine as directed. Contact your healthcare provider if you think your medicine is not helping or if you have side effects. Tell your provider if you are allergic to any medicine. Keep a list of the medicines, vitamins, and herbs you take. Include the amounts, and when and why you take them. Bring the list or the pill bottles to follow-up visits. Carry your medicine list with you in case of an emergency.      Have someone stay with you during withdrawal: This person should help you take your medicine and keep you in a calm, quiet environment. He or she should also watch your symptoms and know what to do if your symptoms get worse.    Learn to stop drinking alcohol safely: Work with your healthcare provider to develop a plan for you to stop drinking safely. A sudden stop or change can be life-threatening.    For support and more information:   •Alcoholics Anonymous  Web Address: http://www.aa.org      •Substance Abuse and Mental Health Services Administration  [address]  [place] [address]  Web Address: http://www.[facility].gov        Follow up with your healthcare provider within 1 day: Write down your questions so you remember to ask them during your visits.

## 2022-12-10 NOTE — ED ADULT NURSE REASSESSMENT NOTE - NS ED NURSE REASSESS COMMENT FT1
Pt received from KYLE Dior.  Pt is asleep comfortably on stretcher.  Currently receiving 1 of 2 NS Bolus.  No acute distress noted.  Safety maintained, call bell within reach.  Will continue to monitor.

## 2022-12-10 NOTE — ED PROVIDER NOTE - CONSTITUTIONAL, MLM
normal... Well appearing, awake, alert, oriented to person, place, time/situation and in no apparent distress. mildly anxious appearing

## 2022-12-10 NOTE — ED PROVIDER NOTE - ENMT, MLM
Airway patent, Nasal mucosa clear. Throat has no vesicles, no oropharyngeal exudates and uvula is midline. dry MM

## 2022-12-10 NOTE — ED ADULT NURSE NOTE - PAIN RATING/NUMBER SCALE (0-10): ACTIVITY
Telephone Encounter by Karen Sharif MA at 09/11/18 11:47 AM     Author:  Karen Sharif MA Service:  (none) Author Type:  Medical Assistant     Filed:  09/11/18 11:47 AM Encounter Date:  9/7/2018 Status:  Signed     :  Karen Sharif MA (Medical Assistant)            To Dr.Dreyer  Last OV:[DG1.1T]2/7/18[DG1.1M]  Last Refill:[DG1.1T]2/7/18[DG1.1M]        Revision History        User Key Date/Time User Provider Type Action    > DG1.1 09/11/18 11:47 AM Karen Sharif MA Medical Assistant Sign    M - Manual, T - Template            
5

## 2022-12-10 NOTE — ED PROVIDER NOTE - PATIENT PORTAL LINK FT
You can access the FollowMyHealth Patient Portal offered by [facility] by registering at the following website: [email or web address]. By joining [facility]’s FollowMyHealth portal, you will also be able to view your health information using other applications (apps) compatible with our system.

## 2022-12-10 NOTE — ED ADULT NURSE NOTE - OBJECTIVE STATEMENT
Patient presents to ED because she feels like she is going through alcohol withdrawal. Chest pain 5/10 on pain scale. Nausea, dizziness shortness of breath, chills. Patient states she normally has 10 drinks a day. last drink 6 hours ago. Alert and oriented x 4. No signs or symptoms of vomiting, pain on urination or diarrhea.

## 2022-12-10 NOTE — ED PROVIDER NOTE - OBJECTIVE STATEMENT
22-year-old female with history of alcohol abuse complaining of alcohol withdrawal, moderate, for the last 2 hours.  Associated with palpitations, nausea, feeling hot and cold.  Mild chest pain.  Feeling shaky.  No SOB.  No diarrhea.  No fever.  No SI HI hallucinations.  Patient states she drinks 10-15 hard seltzers daily.  Last drink was about 6 hours ago.  Patient has been binging last 4 days and has not taken her lithium 900 mg nightly for the last 4 days.  Patient states she has had rehab set up in 2 days in [place]

## 2022-12-10 NOTE — ED ADULT NURSE NOTE - CHIEF COMPLAINT QUOTE
Patient presents to ED because she feels like she is going through alcohol withdrawal. Chest pain 5/10 on pain scale. Nausea, dizziness shortness of breath, chills. Patient states she normally has 10 drinks a day. last drink 6 hours ago. Alert and oriented x 4.

## 2022-12-10 NOTE — ED ADULT TRIAGE NOTE - CHIEF COMPLAINT QUOTE
Hpi Title: Evaluation of Skin Lesions
How Severe Are Your Spot(S)?: mild
Have Your Spot(S) Been Treated In The Past?: has not been treated
Patient presents to ED because she feels like she is going through alcohol withdrawal. Chest pain 5/10 on pain scale. Nausea, dizziness shortness of breath, chills. Patient states she normally has 10 drinks a day. last drink 6 hours ago. Alert and oriented x 4.

## 2023-01-11 NOTE — ED ADULT NURSE NOTE - NS ED NOTE ABUSE SUSPICION NEGLECT YN
PROVIDER:[TOKEN:[[id number]],SCHEDULEDAPPT:[01/17/2023],SCHEDULEDAPPTTIME:[01:15 PM]],PROVIDER:[TOKEN:[[id number]],FOLLOWUP:[2 weeks]] PROVIDER:[TOKEN:[[id number]],SCHEDULEDAPPT:[01/17/2023],SCHEDULEDAPPTTIME:[01:15 PM]],PROVIDER:[TOKEN:[[id number]],FOLLOWUP:[2 weeks]],PROVIDER:[TOKEN:[[id number]],FOLLOWUP:[2 weeks]] No

## 2023-02-13 ENCOUNTER — OUTPATIENT (OUTPATIENT)
Dept: OUTPATIENT SERVICES | Facility: HOSPITAL | Age: 23
LOS: 1 days | Discharge: HOME | End: 2023-02-13
Payer: SELF-PAY

## 2023-02-13 DIAGNOSIS — Z90.49 ACQUIRED ABSENCE OF OTHER SPECIFIED PARTS OF DIGESTIVE TRACT: Chronic | ICD-10-CM

## 2023-02-13 PROCEDURE — 99214 OFFICE O/P EST MOD 30 MIN: CPT | Mod: 95

## 2023-02-14 DIAGNOSIS — F31.9 BIPOLAR DISORDER, UNSPECIFIED: ICD-10-CM

## 2023-02-14 DIAGNOSIS — F10.20 ALCOHOL DEPENDENCE, UNCOMPLICATED: ICD-10-CM

## 2024-03-21 NOTE — ED PEDIATRIC TRIAGE NOTE - HEART RATE (BEATS/MIN)
[FreeTextEntry1] : On physical examination he has no swelling or tenderness in the region of the carpal navicular or distal radius.  There is a full range of motion of the wrist and thumb. 98

## 2024-09-30 NOTE — ED ADULT NURSE NOTE - CODE STROKE ACTIVE YN
In an effort to ensure that our patients [employer], a Team Member has reviewed your chart and identified an opportunity to provide the best care possible. An attempt was made to discuss or schedule due or overdue Preventive or Chronic Condition care.    The Outcome was Contact was not made, left message. We are attempting to schedule a nurse visit. If you have any questions or need help with scheduling, contact our Health Outreach Team at [phone].   Type of Appointment needed: Comprehensive Annual Visit  
No